# Patient Record
Sex: FEMALE | Race: WHITE | HISPANIC OR LATINO | Employment: PART TIME | URBAN - METROPOLITAN AREA
[De-identification: names, ages, dates, MRNs, and addresses within clinical notes are randomized per-mention and may not be internally consistent; named-entity substitution may affect disease eponyms.]

---

## 2024-07-09 ENCOUNTER — HOSPITAL ENCOUNTER (EMERGENCY)
Facility: HOSPITAL | Age: 39
Discharge: HOME | End: 2024-07-09
Attending: EMERGENCY MEDICINE

## 2024-07-09 ENCOUNTER — APPOINTMENT (OUTPATIENT)
Dept: ULTRASOUND IMAGING | Facility: HOSPITAL | Age: 39
End: 2024-07-09

## 2024-07-09 VITALS
BODY MASS INDEX: 27.21 KG/M2 | TEMPERATURE: 98.6 F | DIASTOLIC BLOOD PRESSURE: 62 MMHG | HEART RATE: 88 BPM | RESPIRATION RATE: 16 BRPM | HEIGHT: 59 IN | SYSTOLIC BLOOD PRESSURE: 124 MMHG | WEIGHT: 135 LBS | OXYGEN SATURATION: 98 %

## 2024-07-09 DIAGNOSIS — R10.2 PELVIC PAIN: Primary | ICD-10-CM

## 2024-07-09 LAB
ALBUMIN SERPL-MCNC: 4.5 G/DL (ref 3.5–5.2)
ALBUMIN/GLOB SERPL: 1.4 G/DL
ALP SERPL-CCNC: 66 U/L (ref 39–117)
ALT SERPL W P-5'-P-CCNC: 25 U/L (ref 1–33)
ANION GAP SERPL CALCULATED.3IONS-SCNC: 13 MMOL/L (ref 5–15)
AST SERPL-CCNC: 24 U/L (ref 1–32)
B-HCG UR QL: NEGATIVE
BASOPHILS # BLD AUTO: 0.05 10*3/MM3 (ref 0–0.2)
BASOPHILS NFR BLD AUTO: 0.6 % (ref 0–1.5)
BILIRUB SERPL-MCNC: 0.5 MG/DL (ref 0–1.2)
BILIRUB UR QL STRIP: NEGATIVE
BUN SERPL-MCNC: 12 MG/DL (ref 6–20)
BUN/CREAT SERPL: 19.4 (ref 7–25)
CALCIUM SPEC-SCNC: 9.8 MG/DL (ref 8.6–10.5)
CHLORIDE SERPL-SCNC: 105 MMOL/L (ref 98–107)
CLARITY UR: CLEAR
CO2 SERPL-SCNC: 24 MMOL/L (ref 22–29)
COLOR UR: YELLOW
CREAT SERPL-MCNC: 0.62 MG/DL (ref 0.57–1)
DEPRECATED RDW RBC AUTO: 37.9 FL (ref 37–54)
EGFRCR SERPLBLD CKD-EPI 2021: 117.8 ML/MIN/1.73
EOSINOPHIL # BLD AUTO: 0.12 10*3/MM3 (ref 0–0.4)
EOSINOPHIL NFR BLD AUTO: 1.4 % (ref 0.3–6.2)
ERYTHROCYTE [DISTWIDTH] IN BLOOD BY AUTOMATED COUNT: 12.9 % (ref 12.3–15.4)
EXPIRATION DATE: NORMAL
GLOBULIN UR ELPH-MCNC: 3.2 GM/DL
GLUCOSE SERPL-MCNC: 95 MG/DL (ref 65–99)
GLUCOSE UR STRIP-MCNC: NEGATIVE MG/DL
HCT VFR BLD AUTO: 43.9 % (ref 34–46.6)
HGB BLD-MCNC: 14.6 G/DL (ref 12–15.9)
HGB UR QL STRIP.AUTO: NEGATIVE
HOLD SPECIMEN: NORMAL
IMM GRANULOCYTES # BLD AUTO: 0.03 10*3/MM3 (ref 0–0.05)
IMM GRANULOCYTES NFR BLD AUTO: 0.3 % (ref 0–0.5)
INTERNAL NEGATIVE CONTROL: NORMAL
INTERNAL POSITIVE CONTROL: NORMAL
KETONES UR QL STRIP: ABNORMAL
LEUKOCYTE ESTERASE UR QL STRIP.AUTO: NEGATIVE
LIPASE SERPL-CCNC: 32 U/L (ref 13–60)
LYMPHOCYTES # BLD AUTO: 3.59 10*3/MM3 (ref 0.7–3.1)
LYMPHOCYTES NFR BLD AUTO: 41.8 % (ref 19.6–45.3)
Lab: NORMAL
MCH RBC QN AUTO: 27 PG (ref 26.6–33)
MCHC RBC AUTO-ENTMCNC: 33.3 G/DL (ref 31.5–35.7)
MCV RBC AUTO: 81.1 FL (ref 79–97)
MONOCYTES # BLD AUTO: 0.67 10*3/MM3 (ref 0.1–0.9)
MONOCYTES NFR BLD AUTO: 7.8 % (ref 5–12)
NEUTROPHILS NFR BLD AUTO: 4.13 10*3/MM3 (ref 1.7–7)
NEUTROPHILS NFR BLD AUTO: 48.1 % (ref 42.7–76)
NITRITE UR QL STRIP: NEGATIVE
NRBC BLD AUTO-RTO: 0 /100 WBC (ref 0–0.2)
PH UR STRIP.AUTO: 5.5 [PH] (ref 5–8)
PLATELET # BLD AUTO: 316 10*3/MM3 (ref 140–450)
PMV BLD AUTO: 10.3 FL (ref 6–12)
POTASSIUM SERPL-SCNC: 3.9 MMOL/L (ref 3.5–5.2)
PROT SERPL-MCNC: 7.7 G/DL (ref 6–8.5)
PROT UR QL STRIP: NEGATIVE
RBC # BLD AUTO: 5.41 10*6/MM3 (ref 3.77–5.28)
SODIUM SERPL-SCNC: 142 MMOL/L (ref 136–145)
SP GR UR STRIP: 1.03 (ref 1–1.03)
UROBILINOGEN UR QL STRIP: ABNORMAL
WBC NRBC COR # BLD AUTO: 8.59 10*3/MM3 (ref 3.4–10.8)
WHOLE BLOOD HOLD COAG: NORMAL
WHOLE BLOOD HOLD SPECIMEN: NORMAL

## 2024-07-09 PROCEDURE — 96374 THER/PROPH/DIAG INJ IV PUSH: CPT

## 2024-07-09 PROCEDURE — 25810000003 SODIUM CHLORIDE 0.9 % SOLUTION: Performed by: PHYSICIAN ASSISTANT

## 2024-07-09 PROCEDURE — 81003 URINALYSIS AUTO W/O SCOPE: CPT | Performed by: PHYSICIAN ASSISTANT

## 2024-07-09 PROCEDURE — 85025 COMPLETE CBC W/AUTO DIFF WBC: CPT | Performed by: PHYSICIAN ASSISTANT

## 2024-07-09 PROCEDURE — 80053 COMPREHEN METABOLIC PANEL: CPT | Performed by: PHYSICIAN ASSISTANT

## 2024-07-09 PROCEDURE — 96361 HYDRATE IV INFUSION ADD-ON: CPT

## 2024-07-09 PROCEDURE — 25010000002 KETOROLAC TROMETHAMINE PER 15 MG: Performed by: PHYSICIAN ASSISTANT

## 2024-07-09 PROCEDURE — 99284 EMERGENCY DEPT VISIT MOD MDM: CPT

## 2024-07-09 PROCEDURE — 76830 TRANSVAGINAL US NON-OB: CPT

## 2024-07-09 PROCEDURE — 83690 ASSAY OF LIPASE: CPT | Performed by: PHYSICIAN ASSISTANT

## 2024-07-09 PROCEDURE — 81025 URINE PREGNANCY TEST: CPT | Performed by: PHYSICIAN ASSISTANT

## 2024-07-09 RX ORDER — ONDANSETRON 2 MG/ML
4 INJECTION INTRAMUSCULAR; INTRAVENOUS ONCE
Status: DISCONTINUED | OUTPATIENT
Start: 2024-07-09 | End: 2024-07-10 | Stop reason: HOSPADM

## 2024-07-09 RX ORDER — SODIUM CHLORIDE 0.9 % (FLUSH) 0.9 %
10 SYRINGE (ML) INJECTION AS NEEDED
Status: DISCONTINUED | OUTPATIENT
Start: 2024-07-09 | End: 2024-07-10 | Stop reason: HOSPADM

## 2024-07-09 RX ORDER — KETOROLAC TROMETHAMINE 15 MG/ML
15 INJECTION, SOLUTION INTRAMUSCULAR; INTRAVENOUS ONCE
Status: COMPLETED | OUTPATIENT
Start: 2024-07-09 | End: 2024-07-09

## 2024-07-09 RX ADMIN — SODIUM CHLORIDE 1000 ML: 9 INJECTION, SOLUTION INTRAVENOUS at 20:03

## 2024-07-09 RX ADMIN — KETOROLAC TROMETHAMINE 15 MG: 15 INJECTION, SOLUTION INTRAMUSCULAR; INTRAVENOUS at 20:04

## 2024-07-09 NOTE — ED PROVIDER NOTES
Subjective  History of Present Illness:    Chief Complaint: Pelvic pain  History of Present Illness: 39-year-old female presents with pelvic pain, she is status post recent spontaneous .  She was initially seen in the emergency department at  on 4 6 for bleeding while pregnant, she had an ultrasound that showed a fetus measuring 6 weeks 1 day, she returned to the emergency department at  on 410 and repeat ultrasound showed no intrauterine gestational sac, she was seen on 422 and had ultrasound with complete spontaneous  without complication, without remaining products of conception.  She states that she was told that she needed a follow-up ultrasound, she has an order for that and it has not been done.  She also states that she was written prescriptions for an infection, and included azithromycin, antifungal, diclofenac, she is not sure of the details of the infection.  She states that she does have itching and burning in the area at times  Onset: Gradual  Duration: Several weeks  Exacerbating / Alleviating factors: Pain in her pelvic area  Associated symptoms: Reported infection after spontaneous       Nurses Notes reviewed and agree, including vitals, allergies, social history and prior medical history.     REVIEW OF SYSTEMS: All systems reviewed and not pertinent unless noted.    Review of Systems   Genitourinary:  Positive for pelvic pain.   All other systems reviewed and are negative.      History reviewed. No pertinent past medical history.    Allergies:    Patient has no known allergies.      History reviewed. No pertinent surgical history.      Social History     Socioeconomic History    Marital status:    Tobacco Use    Smoking status: Never   Substance and Sexual Activity    Alcohol use: Never    Drug use: Never         History reviewed. No pertinent family history.    Objective  Physical Exam:  /62   Pulse 88   Temp 98.6 °F (37 °C) (Oral)   Resp 16   Ht 150 cm  "(59.06\")   Wt 61.2 kg (135 lb)   LMP 06/27/2024   SpO2 98%   BMI 27.22 kg/m²      Physical Exam  Vitals and nursing note reviewed.   Constitutional:       Appearance: She is well-developed.   HENT:      Head: Normocephalic and atraumatic.   Cardiovascular:      Rate and Rhythm: Normal rate and regular rhythm.   Pulmonary:      Effort: Pulmonary effort is normal.      Breath sounds: Normal breath sounds.   Abdominal:      Palpations: Abdomen is soft.   Musculoskeletal:         General: Normal range of motion.      Cervical back: Normal range of motion and neck supple.   Skin:     General: Skin is warm and dry.   Neurological:      Mental Status: She is alert and oriented to person, place, and time.      Deep Tendon Reflexes: Reflexes are normal and symmetric.           Procedures    ED Course:    ED Course as of 07/10/24 0257   Tue Jul 09, 2024   1936 Review of previous  non ED visits, prior labs, prior imaging, available notes from prior evaluations or visits with specialists, medication list, allergies, past medical history, past surgical history     [CS]   2008 Outside prior test review of studies including review of recent office visit at pulmonology clinic [facility] including notes, labs, and ultrasound report [CS]   2201 All results explained to patient including lab results, and ultrasound, at the bedside to the , she does not have a current OB/GYN, we were provide an OB/GYN information for her to call and follow-up. [CS]      ED Course User Index  [CS] Cecil Ackerman Jr., RUSLAN       Lab Results (last 24 hours)       Procedure Component Value Units Date/Time    Urinalysis With Microscopic If Indicated (No Culture) - Urine, Clean Catch [[id number]]  (Abnormal) Collected: 07/09/24 1944    Specimen: Urine, Clean Catch Updated: 07/09/24 2004     Color, UA Yellow     Appearance, UA Clear     pH, UA 5.5     Specific Gravity, UA 1.029     Glucose, UA Negative     Ketones, UA " Trace     Bilirubin, UA Negative     Blood, UA Negative     Protein, UA Negative     Leuk Esterase, UA Negative     Nitrite, UA Negative     Urobilinogen, UA 0.2 E.U./dL    Narrative:      Urine microscopic not indicated.    CBC & Differential [[id number]]  (Abnormal) Collected: 07/09/24 1955    Specimen: Blood Updated: 07/09/24 2013    Narrative:      The following orders were created for panel order CBC & Differential.  Procedure                               Abnormality         Status                     ---------                               -----------         ------                     CBC Auto Differential[[id number]]        Abnormal            Final result                 Please view results for these tests on the individual orders.    Comprehensive Metabolic Panel [[id number]] Collected: 07/09/24 1955    Specimen: Blood Updated: 07/09/24 2028     Glucose 95 mg/dL      BUN 12 mg/dL      Creatinine 0.62 mg/dL      Sodium 142 mmol/L      Potassium 3.9 mmol/L      Comment: Slight hemolysis detected by analyzer. Result may be falsely elevated.        Chloride 105 mmol/L      CO2 24.0 mmol/L      Calcium 9.8 mg/dL      Total Protein 7.7 g/dL      Albumin 4.5 g/dL      ALT (SGPT) 25 U/L      AST (SGOT) 24 U/L      Alkaline Phosphatase 66 U/L      Total Bilirubin 0.5 mg/dL      Globulin 3.2 gm/dL      Comment: Calculated Result        A/G Ratio 1.4 g/dL      BUN/Creatinine Ratio 19.4     Anion Gap 13.0 mmol/L      eGFR 117.8 mL/min/1.73     Narrative:      GFR Normal >60  Chronic Kidney Disease <60  Kidney Failure <15      Lipase [[id number]]  (Normal) Collected: 07/09/24 1955    Specimen: Blood Updated: 07/09/24 2028     Lipase 32 U/L     POC Urine Pregnancy [[id number]]  (Normal) Collected: 07/09/24 1955    Specimen: Urine Updated: 07/09/24 1956     HCG, Urine, QL Negative     Lot Number [id number]     Internal Positive Control Passed     Internal Negative Control Passed     Expiration Date 2025-01-28    CBC Auto  Differential [[id number]]  (Abnormal) Collected: 07/09/24 1955    Specimen: Blood Updated: 07/09/24 2013     WBC 8.59 10*3/mm3      RBC 5.41 10*6/mm3      Hemoglobin 14.6 g/dL      Hematocrit 43.9 %      MCV 81.1 fL      MCH 27.0 pg      MCHC 33.3 g/dL      RDW 12.9 %      RDW-SD 37.9 fl      MPV 10.3 fL      Platelets 316 10*3/mm3      Neutrophil % 48.1 %      Lymphocyte % 41.8 %      Monocyte % 7.8 %      Eosinophil % 1.4 %      Basophil % 0.6 %      Immature Grans % 0.3 %      Neutrophils, Absolute 4.13 10*3/mm3      Lymphocytes, Absolute 3.59 10*3/mm3      Monocytes, Absolute 0.67 10*3/mm3      Eosinophils, Absolute 0.12 10*3/mm3      Basophils, Absolute 0.05 10*3/mm3      Immature Grans, Absolute 0.03 10*3/mm3      nRBC 0.0 /100 WBC              US Non-ob Transvaginal    Result Date: 7/9/2024  US NON-OB TRANSVAGINAL Date of Exam: 7/9/2024 8:35 PM EDT Indication: recent sab on 4/22. Comparison: No comparisons available. Technique: Transvaginal pelvic ultrasound was performed.  Grayscale and color Doppler imaging was utilized to evaluate the pelvic area with image documentation per protocol.  Doppler spectral analysis was performed. FINDINGS: The uterus is anteverted and measures 8.8 x 4.3 x 6.6 cm. No focal myometrial abnormalities are identified. The endometrium measures approximately 10 mm in thickness and is within normal limits. The endometrium is mildly heterogeneous. There is no significant fluid in the endometrial canal. No abnormal flow is seen on color flow Doppler evaluation. The right ovary measures 3.2 x 2.5 x 3.2 cm. The left ovary measures 3.5 x 2.2 x 2.0 cm. No focal ovarian abnormalities are seen. Flow is observed within the bilateral ovaries on Doppler evaluation. No significant free fluid is seen in the pelvis. No abnormal adnexal masses are identified.     Impression: 1.Overall unremarkable ultrasound of the pelvis. The endometrium is mildly heterogeneous but within normal limits for the  patient's age. There are no definitive signs of retained products of conception. There are no signs of trophoblastic disease. Clinical follow-up and correlation with beta hCG levels is recommended. Electronically Signed: Arden Maldonado MD  2024 9:16 PM EDT  Workstation ID: [id number]        Medical Decision Making  Patient Presentation 39-year-old female presented with pelvic pain, she was status post recent spontaneous , was cared for at an outside hospital [facility].  On my initial assessment she complained of lower abdominal pain and discomfort.    DDX urinary tract infection, ovarian cyst, pelvic infection, fibroid,    Data Review/ Non ED Records /Analysis/Ordering unique tests  Review of previous  non ED visits, prior labs, prior imaging, available notes from prior evaluations or visits with specialists, medication list, allergies, past medical history, past surgical history        Independent Review Studies  I Personally reviewed all laboratory studies performed in the emergency department     Intervention/Re-evaluation no intervention on reevaluation patient remained stable    Independent Clinician no consultation    Risk Stratification tools/clinical decision rules patient presented with a recent spontaneous , I reviewed all records from [facility], including recent ultrasound, labs, and clinic note, that showed she had no retained products of conception, labs unremarkable, urine unremarkable, I did repeat the ultrasound to rule out any other pelvic abnormality, it was unremarkable as well, discussed all findings with patient and recommended follow-up with OB/GYN, patient stable for discharge    Shared Decision Making discussed with the patient she was agreeable    Disposition patient stable for discharge    Problems Addressed:  Pelvic pain: complicated acute illness or injury    Amount and/or Complexity of Data Reviewed  External Data Reviewed: labs, radiology  and notes.  Labs: ordered. Decision-making details documented in ED Course.  Radiology: ordered.    Risk  Prescription drug management.          Final diagnoses:   Pelvic pain           Disposition DISCHARGE    Patient discharged in stable condition.    Reviewed implications of results, diagnosis, meds, responsibility to follow up, warning signs and symptoms of possible worsening, potential complications and reasons to return to ER.    Patient/Family voiced understanding of above instructions.    Discussed plan for discharge, as there is no emergent indication for admission.  Pt/family is agreeable and understands need for follow up and possible repeat testing.  Pt/family is aware that discharge does not mean that nothing is wrong but that it indicates no emergency is currently present that requires admission and they must continue care with follow-up as given below or with a physician of their choice.     FOLLOW-UP  [facility] EMERGENCY DEPARTMENT  [address] [address] [phone]    If symptoms worsen    Genesis Patino MD  [address]  [place] [id number]  [phone]    Schedule an appointment as soon as possible for a visit            Medication List      No changes were made to your prescriptions during this visit.             Cecil Ackerman Jr., RUSLAN  07/10/24 0257